# Patient Record
Sex: FEMALE | Race: ASIAN | NOT HISPANIC OR LATINO
[De-identification: names, ages, dates, MRNs, and addresses within clinical notes are randomized per-mention and may not be internally consistent; named-entity substitution may affect disease eponyms.]

---

## 2022-05-13 ENCOUNTER — APPOINTMENT (OUTPATIENT)
Dept: ORTHOPEDIC SURGERY | Facility: CLINIC | Age: 52
End: 2022-05-13
Payer: COMMERCIAL

## 2022-05-13 VITALS — BODY MASS INDEX: 22.66 KG/M2 | WEIGHT: 120 LBS | HEIGHT: 61 IN

## 2022-05-13 DIAGNOSIS — Z78.9 OTHER SPECIFIED HEALTH STATUS: ICD-10-CM

## 2022-05-13 DIAGNOSIS — M77.8 OTHER ENTHESOPATHIES, NOT ELSEWHERE CLASSIFIED: ICD-10-CM

## 2022-05-13 PROBLEM — Z00.00 ENCOUNTER FOR PREVENTIVE HEALTH EXAMINATION: Status: ACTIVE | Noted: 2022-05-13

## 2022-05-13 PROCEDURE — 99072 ADDL SUPL MATRL&STAF TM PHE: CPT

## 2022-05-13 PROCEDURE — 99203 OFFICE O/P NEW LOW 30 MIN: CPT

## 2022-05-13 NOTE — IMAGING
[de-identified] : RIGHT HAND EXAM\par +ttp along FCU tendon.\par Skin intact\par No deformity, edema, ecchymosis\par Warm and well perfused\par Brisk capillary refill throughout\par Motor function intact AIN, PIN, and ulnar nerves\par Sensation intact to light touch in median, ulnar, and radial nerves\par Strength with , finger abduction, wrist flexion, wrist extension 5/5\par Finger and wrist motion is intact and full\par Patient is able to make a composite fist

## 2022-05-13 NOTE — HISTORY OF PRESENT ILLNESS
[de-identified] : 52F, RHD, No PMHX presents with right wrist pain for approx 2 weeks. Reports has been on and off with pain. Admits to getting a brace which will help when worn but as soon as the brace comes off, has pain. Admits to difficulty lifting heavy items. Denies numbness/tingling. Has tried NSAIDS w/o relief. Denies outside imaging/treatment. Admits to being prescribed meloxicam and cyclobenzaprine which did help. Started to feel better - lifted something heavy at [employer] and had pain again.  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

## 2022-05-13 NOTE — ASSESSMENT
[FreeTextEntry1] : Right FCU tendonitis - reviewed pathoanatomy with patient. Discussed management will consist of NSADIs prn,bracing prn and OT.\par \par F/u prn

## 2024-05-05 ENCOUNTER — TRANSCRIPTION ENCOUNTER (OUTPATIENT)
Age: 54
End: 2024-05-05